# Patient Record
Sex: MALE | Race: WHITE | NOT HISPANIC OR LATINO | URBAN - NONMETROPOLITAN AREA
[De-identification: names, ages, dates, MRNs, and addresses within clinical notes are randomized per-mention and may not be internally consistent; named-entity substitution may affect disease eponyms.]

---

## 2019-04-26 ENCOUNTER — PREPPED CHART (OUTPATIENT)
Dept: URBAN - NONMETROPOLITAN AREA CLINIC 4 | Facility: CLINIC | Age: 39
End: 2019-04-26

## 2019-04-26 ENCOUNTER — IMPORTED ENCOUNTER (OUTPATIENT)
Dept: URBAN - NONMETROPOLITAN AREA CLINIC 1 | Facility: CLINIC | Age: 39
End: 2019-04-26

## 2019-04-26 PROBLEM — H18.823: Noted: 2019-04-26

## 2019-04-26 PROBLEM — H52.13: Noted: 2019-04-26

## 2019-04-26 PROBLEM — H04.123: Noted: 2019-04-26

## 2019-04-26 PROBLEM — H52.223: Noted: 2019-04-26

## 2019-04-26 PROBLEM — H40.033: Noted: 2019-04-26

## 2019-04-26 PROCEDURE — 92015 DETERMINE REFRACTIVE STATE: CPT

## 2019-04-26 PROCEDURE — 92310 CONTACT LENS FITTING OU: CPT

## 2019-04-26 PROCEDURE — 92014 COMPRE OPH EXAM EST PT 1/>: CPT

## 2019-04-26 NOTE — PATIENT DISCUSSION
Compound Myopic Astigmatism OU-  discussed findings w/patient-  new spectacle/CL Rx issued-  monitor yearly or prn Family History Glaucoma -  discussed findings w/patient-  IOP stable at 14 OU-  C/D 0.3 OU healthy nerve-  no testing needed at this time may need to establish baseline in the future-  will continue to monitor closely-  monitor yearly or prn; 's Notes: MR 4/26/2019DFE 4/26/2019

## 2022-01-21 ASSESSMENT — VISUAL ACUITY
OS_CC: 20/20
OD_CC: 20/20

## 2022-01-21 ASSESSMENT — TONOMETRY
OD_IOP_MMHG: 16
OS_IOP_MMHG: 16

## 2022-01-26 ENCOUNTER — PREPPED CHART (OUTPATIENT)
Dept: URBAN - NONMETROPOLITAN AREA CLINIC 4 | Facility: CLINIC | Age: 42
End: 2022-01-26

## 2022-01-26 ENCOUNTER — COMPREHENSIVE EXAM (OUTPATIENT)
Dept: URBAN - NONMETROPOLITAN AREA CLINIC 4 | Facility: CLINIC | Age: 42
End: 2022-01-26

## 2022-01-26 DIAGNOSIS — H52.13: ICD-10-CM

## 2022-01-26 PROCEDURE — 92015 DETERMINE REFRACTIVE STATE: CPT

## 2022-01-26 PROCEDURE — 92310 CONTACT LENS FITTING OU: CPT

## 2022-01-26 PROCEDURE — 92014 COMPRE OPH EXAM EST PT 1/>: CPT

## 2022-01-26 ASSESSMENT — TONOMETRY
OS_IOP_MMHG: 18
OD_IOP_MMHG: 17

## 2022-01-26 ASSESSMENT — VISUAL ACUITY
OS_CC: 20/20-1
OD_CC: 20/20

## 2022-04-09 ASSESSMENT — PACHYMETRY
OS_CT_UM: 555; ADJ: THIN
OD_CT_UM: 525; ADJ: THIN

## 2022-04-09 ASSESSMENT — VISUAL ACUITY
OS_SC: 20/20
OD_SC: 20/20
OU_SC: 20/20

## 2022-04-09 ASSESSMENT — TONOMETRY
OD_IOP_MMHG: 16
OS_IOP_MMHG: 16

## 2023-04-20 ENCOUNTER — COMPREHENSIVE EXAM (OUTPATIENT)
Dept: URBAN - NONMETROPOLITAN AREA CLINIC 4 | Facility: CLINIC | Age: 43
End: 2023-04-20

## 2023-04-20 DIAGNOSIS — H52.13: ICD-10-CM

## 2023-04-20 PROCEDURE — S0621 ROUTINE OPHTHALMOLOGICAL EXA: HCPCS

## 2023-04-20 PROCEDURE — 92310-E CONTACT LENS FITTING ESTABLISH PATIENT

## 2023-04-20 ASSESSMENT — VISUAL ACUITY
OS_CC: 20/20
OS_CC: 20/30
OD_CC: 20/20
OD_CC: 20/30

## 2023-04-20 ASSESSMENT — TONOMETRY
OD_IOP_MMHG: 18
OS_IOP_MMHG: 18

## 2025-06-11 ENCOUNTER — COMPREHENSIVE EXAM (OUTPATIENT)
Age: 45
End: 2025-06-11

## 2025-06-11 DIAGNOSIS — H52.13: ICD-10-CM

## 2025-06-11 DIAGNOSIS — H04.123: ICD-10-CM

## 2025-06-11 PROCEDURE — 92310-3 LEVEL 3 SOFT LENS UPDATE

## 2025-06-11 PROCEDURE — S0621AEC ROUTINE OPH EXAM INCLUDES REF/ EST PATIENT
